# Patient Record
(demographics unavailable — no encounter records)

---

## 2017-06-29 DIAGNOSIS — F32.A DEPRESSION: ICD-10-CM

## 2017-06-29 RX ORDER — SERTRALINE HYDROCHLORIDE 100 MG/1
TABLET, FILM COATED ORAL
Qty: 30 TABLET | Refills: 3 | Status: SHIPPED | OUTPATIENT
Start: 2017-06-29 | End: 2017-11-04 | Stop reason: SDUPTHER

## 2017-06-29 NOTE — TELEPHONE ENCOUNTER
Patient's last visit was 6/23/16, pap was negative. Next annual scheduled with Dr. Brice for 9/1/17.

## 2017-11-04 DIAGNOSIS — F32.A DEPRESSION: ICD-10-CM

## 2017-11-06 RX ORDER — SERTRALINE HYDROCHLORIDE 100 MG/1
TABLET, FILM COATED ORAL
Qty: 30 TABLET | Refills: 2 | Status: SHIPPED | OUTPATIENT
Start: 2017-11-06 | End: 2018-01-30 | Stop reason: SDUPTHER

## 2018-01-30 DIAGNOSIS — F32.A DEPRESSION: ICD-10-CM

## 2018-01-30 RX ORDER — SERTRALINE HYDROCHLORIDE 100 MG/1
TABLET, FILM COATED ORAL
Qty: 30 TABLET | Refills: 12 | Status: SHIPPED | OUTPATIENT
Start: 2018-01-30